# Patient Record
Sex: FEMALE | Race: NATIVE HAWAIIAN OR OTHER PACIFIC ISLANDER
[De-identification: names, ages, dates, MRNs, and addresses within clinical notes are randomized per-mention and may not be internally consistent; named-entity substitution may affect disease eponyms.]

---

## 2022-08-05 ENCOUNTER — HOSPITAL ENCOUNTER (EMERGENCY)
Dept: HOSPITAL 5 - ED | Age: 32
LOS: 1 days | Discharge: HOME | End: 2022-08-06
Payer: COMMERCIAL

## 2022-08-05 DIAGNOSIS — M94.0: ICD-10-CM

## 2022-08-05 DIAGNOSIS — R07.9: Primary | ICD-10-CM

## 2022-08-05 LAB
ALBUMIN SERPL-MCNC: 4.3 G/DL (ref 3.9–5)
ALT SERPL-CCNC: 19 UNITS/L (ref 7–56)
BASOPHILS # (AUTO): 0.1 K/MM3 (ref 0–0.1)
BASOPHILS NFR BLD AUTO: 0.9 % (ref 0–1.8)
BUN SERPL-MCNC: 11 MG/DL (ref 7–17)
BUN/CREAT SERPL: 18 %
CALCIUM SERPL-MCNC: 9.1 MG/DL (ref 8.4–10.2)
EOSINOPHIL # BLD AUTO: 0.2 K/MM3 (ref 0–0.4)
EOSINOPHIL NFR BLD AUTO: 1.7 % (ref 0–4.3)
HCT VFR BLD CALC: 38.1 % (ref 30.3–42.9)
HEMOLYSIS INDEX: 11
HGB BLD-MCNC: 12.3 GM/DL (ref 10.1–14.3)
LYMPHOCYTES # BLD AUTO: 3.6 K/MM3 (ref 1.2–5.4)
LYMPHOCYTES NFR BLD AUTO: 33.9 % (ref 13.4–35)
MCHC RBC AUTO-ENTMCNC: 32 % (ref 30–34)
MCV RBC AUTO: 77 FL (ref 79–97)
MONOCYTES # (AUTO): 0.7 K/MM3 (ref 0–0.8)
MONOCYTES % (AUTO): 6.3 % (ref 0–7.3)
PLATELET # BLD: 230 K/MM3 (ref 140–440)
RBC # BLD AUTO: 4.97 M/MM3 (ref 3.65–5.03)

## 2022-08-05 PROCEDURE — 85025 COMPLETE CBC W/AUTO DIFF WBC: CPT

## 2022-08-05 PROCEDURE — 80053 COMPREHEN METABOLIC PANEL: CPT

## 2022-08-05 PROCEDURE — 84484 ASSAY OF TROPONIN QUANT: CPT

## 2022-08-05 PROCEDURE — 36415 COLL VENOUS BLD VENIPUNCTURE: CPT

## 2022-08-05 PROCEDURE — 99283 EMERGENCY DEPT VISIT LOW MDM: CPT

## 2022-08-05 PROCEDURE — 93005 ELECTROCARDIOGRAM TRACING: CPT

## 2022-08-05 PROCEDURE — 99284 EMERGENCY DEPT VISIT MOD MDM: CPT

## 2022-08-05 PROCEDURE — 71046 X-RAY EXAM CHEST 2 VIEWS: CPT

## 2022-08-05 NOTE — XRAY REPORT
CHEST 2 VIEWS 



INDICATION / CLINICAL INFORMATION: CHEST PAIN.



COMPARISON: None available.



FINDINGS:



SUPPORT DEVICES: None.

HEART / MEDIASTINUM: No significant abnormality. 

LUNGS / PLEURA: No significant pulmonary or pleural abnormality. No pneumothorax. 



ADDITIONAL FINDINGS: No significant additional findings.



IMPRESSION:

1. No acute findings.



Signer Name: Mauricio Friend MD 

Signed: 8/5/2022 9:53 PM

Workstation Name: [id number]

## 2022-08-06 VITALS — SYSTOLIC BLOOD PRESSURE: 132 MMHG | DIASTOLIC BLOOD PRESSURE: 86 MMHG

## 2022-08-06 NOTE — EMERGENCY DEPARTMENT REPORT
ED Chest Pain HPI





- General


Chief Complaint: Chest Pain


Stated Complaint: CHEST PAIN


Source: patient


Mode of arrival: Ambulatory


Limitations: No Limitations





- History of Present Illness


Initial Comments: 





Patient is a 32-year-old female with no past medical history who presents to the

ED with complaint of acute onset persistent substernal chest pain which she 

describes as burning, sharp and pressure for the last 12 hours constantly.  

Patient states that the pain has been constant and persistent and is worsened 

with food.  Patient denies nausea and vomiting, dizziness, syncope, shortness of

breath, fever, chills, cough, abdominal pain, hemoptysis, hematemesis, diarrhea,

traumatic injury, heavy lifting, numbness and tingling or weakness of upper 

extremities bilaterally or neck pain.


MD Complaint: chest pain (substernal)


-: Sudden, hour(s) (12)


Onset: after eating, awoke with symptoms


Pain Location: substernal, left chest


Pain Radiation: none


Severity: severe


Severity scale (0 -10): 8


Quality: aching, sharp


Consistency: constant


Improves With: nothing


Worsens With: nothing


re: denies: nausea, vomting, diaphoresis, dyspnea, sense of impending doom, 

other


Other Symptoms: denies: cough, fever, syncope, rash, acid taste in mouth, 

palpitations, burping, other


Treatments Prior to Arrival: none





- Related Data


On Oral Contraceptives: No


                                  Previous Rx's











 Medication  Instructions  Recorded  Last Taken  Type


 


Famotidine [Pepcid] 20 mg PO BID #60 tablet 22 Unknown Rx


 


Naproxen 500 mg PO Q12H PRN #30 tab 22 Unknown Rx











                                    Allergies











Allergy/AdvReac Type Severity Reaction Status Date / Time


 


No Known Allergies Allergy   Verified 22 21:22














Heart Score





- HEART Score


History: Slightly suspicious


EKG: Normal


Age: < 45


Risk factors: No known risk factors


Troponin: < normal limit


HEART Score: 0





- EKG Read Time


Time EKG Completed: 21:25


EKG Read Time: 21:30





- Critical Actions


Critical Actions: 0-3 pts:0.9-1.7%risk of adverse cardiac event.Candidate for 

discharge





ED Review of Systems


ROS: 


Stated complaint: CHEST PAIN


Other details as noted in HPI





Constitutional: denies: chills, fever


Eyes: denies: eye pain, eye discharge, vision change


ENT: denies: ear pain, throat pain


Respiratory: denies: cough, shortness of breath, wheezing


Cardiovascular: chest pain.  denies: palpitations


Endocrine: no symptoms reported


Gastrointestinal: denies: abdominal pain, nausea, diarrhea


Genitourinary: denies: urgency, dysuria, discharge


Musculoskeletal: denies: back pain, joint swelling, arthralgia


Skin: denies: rash, lesions


Neurological: denies: headache, weakness, paresthesias


Psychiatric: denies: anxiety, depression


Hematological/Lymphatic: denies: easy bleeding, easy bruising





ED Past Medical Hx





- Medications


Home Medications: 


                                Home Medications











 Medication  Instructions  Recorded  Confirmed  Last Taken  Type


 


Famotidine [Pepcid] 20 mg PO BID #60 tablet 22  Unknown Rx


 


Naproxen 500 mg PO Q12H PRN #30 tab 22  Unknown Rx














ED Physical Exam





- General


Limitations: No Limitations


General appearance: alert, in no apparent distress





- Head


Head exam: Present: atraumatic, normocephalic, normal inspection





- Eye


Eye exam: Present: normal appearance, PERRL, EOMI


Pupils: Present: normal accommodation





- ENT


ENT exam: Present: normal exam, normal orophraynx, mucous membranes moist, TM's 

normal bilaterally, normal external ear exam





- Neck


Neck exam: Present: normal inspection, full ROM.  Absent: tenderness





- Respiratory


Respiratory exam: Present: normal lung sounds bilaterally.  Absent: respiratory 

distress, wheezes, rales, rhonchi, stridor, chest wall tenderness, accessory 

muscle use, decreased breath sounds, prolonged expiratory





- Cardiovascular


Cardiovascular Exam: Present: regular rate, normal rhythm, normal heart sounds. 

 Absent: systolic murmur, diastolic murmur, rubs, gallop





- GI/Abdominal


GI/Abdominal exam: Present: soft, normal bowel sounds.  Absent: distended, 

tenderness, guarding, rebound, hyperactive bowel sounds, hypoactive bowel 

sounds, organomegaly, mass





- Extremities Exam


Extremities exam: Present: normal inspection, full ROM, normal capillary refill.

  Absent: tenderness, pedal edema, joint swelling





- Back Exam


Back exam: Present: normal inspection, full ROM.  Absent: tenderness, CVA 

tenderness (R), muscle spasm, paraspinal tenderness, vertebral tenderness





- Neurological Exam


Neurological exam: Present: alert, oriented X3, CN II-XII intact, normal gait, 

reflexes normal





- Psychiatric


Psychiatric exam: Present: normal affect, normal mood





- Skin


Skin exam: Present: warm, dry, intact, normal color.  Absent: rash





ED Course


                                   Vital Signs











  22





  21:21 02:47


 


Temperature 98.3 F 


 


Pulse Rate 85 84


 


Respiratory 18 16





Rate  


 


Blood Pressure 133/86 


 


Blood Pressure  132/86





[Left]  


 


O2 Sat by Pulse 99 100





Oximetry  














MOY score





- Moy Score


Age > 65: (0) No


Aspirin use within the Past 7 Days: (0) No


3 or more CAD Risk Factors: (0) No


2 or more Angina events in past 24 hrs: (0) No


Known CAD with more than 50% Stenosis: (0) No


Elevated Cardiac Markers: (0) No


ST Deviation Greater than 0.5mm: (0) No


MOY Score: 0





ED Medical Decision Making





- Lab Data


Result diagrams: 


                                 22 22:56





                                 22 22:56





- EKG Data


EKG shows normal: sinus rhythm


Rate: normal





- EKG Data


Interpretation: normal EKG





22 08:29


EKG showed normal sinus rhythm with a ventricular rate of 84 bpm and no ST or T 

wave abnormalities.





- Radiology Data


Radiology results: report reviewed, image reviewed





[facility]  


                                     [address] [place] [address]  


 


                                            XRay Report   


                                               Signed  


 


Patient: BRIAN PRITCHARD                                                         

       MR#: [id number]  


16          


: 1990                                                                

Acct:[id number]      


 


Age/Sex: 32 / F                                                                

ADM Date: 22     


 


Loc: ED       


Attending Dr:   


 


 


Ordering Physician: ED MD SHERIE  


Date of Service: 22  


Procedure(s): XR chest routine 2V  


Accession Number(s): [id number]  


 


cc: ED MD SHERIE   


 


Fluoro Time In Minutes:   


 


CHEST 2 VIEWS   


 


 INDICATION / CLINICAL INFORMATION: CHEST PAIN.  


 


 COMPARISON: None available.  


 


 FINDINGS:  


 


 SUPPORT DEVICES: None.  


 HEART / MEDIASTINUM: No significant abnormality.   


 LUNGS / PLEURA: No significant pulmonary or pleural abnormality. No 

pneumothorax.   


 


 ADDITIONAL FINDINGS: No significant additional findings.  


 


 IMPRESSION:  


 1. No acute findings.  


 


 Signer Name: Mauricio Friend MD   


 Signed: 2022 9:53 PM  


 Workstation Name: [id number]   


 


 


Transcribed By: DANIELLE  


Dictated By: Mauricio Friend MD  


Electronically Authenticated By: Mauricio Friend MD    


Signed Date/Time: 22                                


 


 


 


DD/DT: 22                                                            

  


TD/TT:








- Medical Decision Making





This is a 32-year-old female with no past medical history who presents to the ED

 with complaint of acute onset persistent substernal chest pain which she 

describes as burning, sharp and pressure for the last 12 hours constantly.  

Patient states that the pain has been constant and persistent and is worsened 

with food.  In the ED, patient is alert and oriented x3 and is not in any 

distress.  Patient is hemodynamically stable.  EKG shows normal sinus rhythm 

with a ventricular rate of 84 bpm and no ST or T wave abnormality.  Chest x-ray 

showed no acute cardiopulmonary abnormalities or pneumonitis.  All lab test resu

lts were reviewed and are all nonactionable.  Patient's heart score is 0 and 

patient is PERC negative per Wells criteria.  Patient was treated for pain in 

the ED and also given antacids.  Patient was also treated with antiemetics.  On 

reevaluation, patient felt better, patient vital signs are stable, patient was 

discharged home on medications and advised to follow-up with her primary care 

physician in 7 to 10 days for reevaluation or return to the ED immediately if 

symptoms get worse.





- Differential Diagnosis


ACS; GERD; costochondritis; PE; dissection; pneumonia; muscle strain


Critical care attestation.: 


If time is entered above; I have spent that time in minutes in the direct care 

of this critically ill patient, excluding procedure time.








ED Disposition


Clinical Impression: 


 Acute nonspecific chest pain with low risk of coronary artery disease, Acute 

costochondritis





Disposition: 01 HOME / SELF CARE / HOMELESS


Is pt being admited?: No


Does the pt Need Aspirin: No


Condition: Stable


Instructions:  Costochondritis, Easy-to-Read, Chest Wall Pain, Easy-to-Read, 

Nonspecific Chest Pain, Adult, Easy-to-Read, Chest Pain (ED)


Additional Instructions: 


All lab test results were reviewed and are all nonactionable.  Chest x-ray 

showed no acute cardiopulmonary abnormalities or pneumonitis.  Symptoms are 

likely musculoskeletal of the chest wall.  Therefore take medication with food, 

drink plenty of fluids follow-up with your primary care physician in 7 to 10 

days.  Return to the ED immediately if symptoms get worse.


Prescriptions: 


Naproxen 500 mg PO Q12H PRN #30 tab


 PRN Reason: Pain , Severe (7-10)


Famotidine [Pepcid] 20 mg PO BID #60 tablet


Referrals: 


[facility] [Provider Group] - 7-10 days


Time of Disposition: 01:52


Print Language: ENGLISH

## 2022-08-08 NOTE — ELECTROCARDIOGRAPH REPORT
[facility]

                                       

Test Date:    2022               Test Time:    21:25:33

Pat Name:     BRIAN PRITCHARD           Department:   

Patient ID:   [id number]          Room:          

Gender:       F                        Technician:   ALDO

:          1990               Requested By: SHAYLA PETERS III

Order Number: [id number]             Reading MD:   Serena Soto

                                 Measurements

Intervals                              Axis          

Rate:         84                       P:            16

GA:           165                      QRS:          47

QRSD:         84                       T:            24

QT:           374                                    

QTc:          443                                    

                           Interpretive Statements

Sinus rhythm

Probable anteroseptal infarct, old

No previous ECG available for comparison

Electronically Signed On 2022 10:10:35 EDT by Serena Soto